# Patient Record
Sex: FEMALE | NOT HISPANIC OR LATINO | URBAN - METROPOLITAN AREA
[De-identification: names, ages, dates, MRNs, and addresses within clinical notes are randomized per-mention and may not be internally consistent; named-entity substitution may affect disease eponyms.]

---

## 2023-01-22 ENCOUNTER — ANESTHESIA EVENT (OUTPATIENT)
Dept: SURGERY | Facility: HOSPITAL | Age: 43
End: 2023-01-22
Payer: MEDICAID

## 2023-01-22 ENCOUNTER — HOSPITAL ENCOUNTER (OUTPATIENT)
Facility: HOSPITAL | Age: 43
Discharge: HOME | End: 2023-01-22
Attending: EMERGENCY MEDICINE | Admitting: EMERGENCY MEDICINE
Payer: MEDICAID

## 2023-01-22 ENCOUNTER — ANESTHESIA (OUTPATIENT)
Dept: SURGERY | Facility: HOSPITAL | Age: 43
End: 2023-01-22
Payer: MEDICAID

## 2023-01-22 VITALS
DIASTOLIC BLOOD PRESSURE: 62 MMHG | SYSTOLIC BLOOD PRESSURE: 122 MMHG | HEART RATE: 76 BPM | WEIGHT: 168 LBS | TEMPERATURE: 98 F | BODY MASS INDEX: 30.91 KG/M2 | HEIGHT: 62 IN | OXYGEN SATURATION: 94 % | RESPIRATION RATE: 19 BRPM

## 2023-01-22 DIAGNOSIS — D72.829 LEUKOCYTOSIS, UNSPECIFIED TYPE: ICD-10-CM

## 2023-01-22 DIAGNOSIS — N83.512 TORSION OF LEFT OVARY: ICD-10-CM

## 2023-01-22 DIAGNOSIS — Z98.890 STATUS POST LAPAROSCOPY: Primary | ICD-10-CM

## 2023-01-22 DIAGNOSIS — R10.32 LLQ ABDOMINAL PAIN: ICD-10-CM

## 2023-01-22 PROBLEM — N83.519 OVARIAN TORSION: Status: ACTIVE | Noted: 2023-01-22

## 2023-01-22 LAB
ABO + RH BLD: NORMAL
ALBUMIN SERPL BCP-MCNC: 4.2 G/DL (ref 3.5–5.2)
ALP SERPL-CCNC: 81 U/L (ref 55–135)
ALT SERPL W/O P-5'-P-CCNC: 7 U/L (ref 10–44)
ANION GAP SERPL CALC-SCNC: 9 MMOL/L (ref 8–16)
AST SERPL-CCNC: 14 U/L (ref 10–40)
B-HCG UR QL: NEGATIVE
BASOPHILS # BLD AUTO: 0.05 K/UL (ref 0–0.2)
BASOPHILS NFR BLD: 0.3 % (ref 0–1.9)
BILIRUB SERPL-MCNC: 0.2 MG/DL (ref 0.1–1)
BILIRUB UR QL STRIP: NEGATIVE
BLD GP AB SCN CELLS X3 SERPL QL: NORMAL
BUN SERPL-MCNC: 10 MG/DL (ref 6–20)
CALCIUM SERPL-MCNC: 10.3 MG/DL (ref 8.7–10.5)
CHLORIDE SERPL-SCNC: 105 MMOL/L (ref 95–110)
CLARITY UR: CLEAR
CO2 SERPL-SCNC: 23 MMOL/L (ref 23–29)
COLOR UR: YELLOW
CREAT SERPL-MCNC: 0.7 MG/DL (ref 0.5–1.4)
CTP QC/QA: YES
DIFFERENTIAL METHOD: ABNORMAL
EOSINOPHIL # BLD AUTO: 0 K/UL (ref 0–0.5)
EOSINOPHIL NFR BLD: 0.1 % (ref 0–8)
ERYTHROCYTE [DISTWIDTH] IN BLOOD BY AUTOMATED COUNT: 13.4 % (ref 11.5–14.5)
EST. GFR  (NO RACE VARIABLE): >60 ML/MIN/1.73 M^2
GLUCOSE SERPL-MCNC: 105 MG/DL (ref 70–110)
GLUCOSE UR QL STRIP: NEGATIVE
HCT VFR BLD AUTO: 44 % (ref 37–48.5)
HGB BLD-MCNC: 14.8 G/DL (ref 12–16)
HGB UR QL STRIP: NEGATIVE
IMM GRANULOCYTES # BLD AUTO: 0.07 K/UL (ref 0–0.04)
IMM GRANULOCYTES NFR BLD AUTO: 0.4 % (ref 0–0.5)
KETONES UR QL STRIP: ABNORMAL
LACTATE SERPL-SCNC: 1.4 MMOL/L (ref 0.5–2.2)
LEUKOCYTE ESTERASE UR QL STRIP: NEGATIVE
LIPASE SERPL-CCNC: 15 U/L (ref 4–60)
LYMPHOCYTES # BLD AUTO: 1.6 K/UL (ref 1–4.8)
LYMPHOCYTES NFR BLD: 8.8 % (ref 18–48)
MCH RBC QN AUTO: 30.3 PG (ref 27–31)
MCHC RBC AUTO-ENTMCNC: 33.6 G/DL (ref 32–36)
MCV RBC AUTO: 90 FL (ref 82–98)
MONOCYTES # BLD AUTO: 0.6 K/UL (ref 0.3–1)
MONOCYTES NFR BLD: 3.3 % (ref 4–15)
NEUTROPHILS # BLD AUTO: 15.9 K/UL (ref 1.8–7.7)
NEUTROPHILS NFR BLD: 87.1 % (ref 38–73)
NITRITE UR QL STRIP: NEGATIVE
NRBC BLD-RTO: 0 /100 WBC
PH UR STRIP: 8 [PH] (ref 5–8)
PLATELET # BLD AUTO: 433 K/UL (ref 150–450)
PMV BLD AUTO: 9.8 FL (ref 9.2–12.9)
POTASSIUM SERPL-SCNC: 4 MMOL/L (ref 3.5–5.1)
PROT SERPL-MCNC: 7.4 G/DL (ref 6–8.4)
PROT UR QL STRIP: ABNORMAL
RBC # BLD AUTO: 4.88 M/UL (ref 4–5.4)
SODIUM SERPL-SCNC: 137 MMOL/L (ref 136–145)
SP GR UR STRIP: 1.03 (ref 1–1.03)
URN SPEC COLLECT METH UR: ABNORMAL
UROBILINOGEN UR STRIP-ACNC: NEGATIVE EU/DL
WBC # BLD AUTO: 18.27 K/UL (ref 3.9–12.7)

## 2023-01-22 PROCEDURE — 83690 ASSAY OF LIPASE: CPT | Performed by: PHYSICIAN ASSISTANT

## 2023-01-22 PROCEDURE — G0378 HOSPITAL OBSERVATION PER HR: HCPCS

## 2023-01-22 PROCEDURE — 63600175 PHARM REV CODE 636 W HCPCS: Performed by: NURSE ANESTHETIST, CERTIFIED REGISTERED

## 2023-01-22 PROCEDURE — 36000709 HC OR TIME LEV III EA ADD 15 MIN: Performed by: OBSTETRICS & GYNECOLOGY

## 2023-01-22 PROCEDURE — 58661 LAPAROSCOPY REMOVE ADNEXA: CPT | Mod: LT,,, | Performed by: OBSTETRICS & GYNECOLOGY

## 2023-01-22 PROCEDURE — 88305 TISSUE EXAM BY PATHOLOGIST: CPT | Mod: 26,,, | Performed by: PATHOLOGY

## 2023-01-22 PROCEDURE — 58661 PR LAP,RMV  ADNEXAL STRUCTURE: ICD-10-PCS | Mod: 80,LT,, | Performed by: OBSTETRICS & GYNECOLOGY

## 2023-01-22 PROCEDURE — 81025 URINE PREGNANCY TEST: CPT | Performed by: PHYSICIAN ASSISTANT

## 2023-01-22 PROCEDURE — 71000033 HC RECOVERY, INTIAL HOUR: Performed by: OBSTETRICS & GYNECOLOGY

## 2023-01-22 PROCEDURE — 86900 BLOOD TYPING SEROLOGIC ABO: CPT | Performed by: PHYSICIAN ASSISTANT

## 2023-01-22 PROCEDURE — 96374 THER/PROPH/DIAG INJ IV PUSH: CPT

## 2023-01-22 PROCEDURE — 25000003 PHARM REV CODE 250: Performed by: PHYSICIAN ASSISTANT

## 2023-01-22 PROCEDURE — 25000003 PHARM REV CODE 250: Performed by: NURSE ANESTHETIST, CERTIFIED REGISTERED

## 2023-01-22 PROCEDURE — 27201423 OPTIME MED/SURG SUP & DEVICES STERILE SUPPLY: Performed by: OBSTETRICS & GYNECOLOGY

## 2023-01-22 PROCEDURE — 25500020 PHARM REV CODE 255: Performed by: EMERGENCY MEDICINE

## 2023-01-22 PROCEDURE — 37000009 HC ANESTHESIA EA ADD 15 MINS: Performed by: OBSTETRICS & GYNECOLOGY

## 2023-01-22 PROCEDURE — 83605 ASSAY OF LACTIC ACID: CPT | Performed by: PHYSICIAN ASSISTANT

## 2023-01-22 PROCEDURE — D9220A PRA ANESTHESIA: ICD-10-PCS | Mod: ANES,,, | Performed by: ANESTHESIOLOGY

## 2023-01-22 PROCEDURE — 58661 LAPAROSCOPY REMOVE ADNEXA: CPT | Mod: 80,LT,, | Performed by: OBSTETRICS & GYNECOLOGY

## 2023-01-22 PROCEDURE — 85025 COMPLETE CBC W/AUTO DIFF WBC: CPT | Performed by: PHYSICIAN ASSISTANT

## 2023-01-22 PROCEDURE — 37000008 HC ANESTHESIA 1ST 15 MINUTES: Performed by: OBSTETRICS & GYNECOLOGY

## 2023-01-22 PROCEDURE — 81003 URINALYSIS AUTO W/O SCOPE: CPT | Performed by: PHYSICIAN ASSISTANT

## 2023-01-22 PROCEDURE — 63600175 PHARM REV CODE 636 W HCPCS: Mod: TB,JG | Performed by: ANESTHESIOLOGY

## 2023-01-22 PROCEDURE — 96375 TX/PRO/DX INJ NEW DRUG ADDON: CPT | Mod: 59

## 2023-01-22 PROCEDURE — 00840 ANES IPER PX LOWER ABD NOS: CPT | Performed by: OBSTETRICS & GYNECOLOGY

## 2023-01-22 PROCEDURE — 25000003 PHARM REV CODE 250: Performed by: OBSTETRICS & GYNECOLOGY

## 2023-01-22 PROCEDURE — D9220A PRA ANESTHESIA: Mod: CRNA,,, | Performed by: NURSE ANESTHETIST, CERTIFIED REGISTERED

## 2023-01-22 PROCEDURE — D9220A PRA ANESTHESIA: Mod: ANES,,, | Performed by: ANESTHESIOLOGY

## 2023-01-22 PROCEDURE — 25000242 PHARM REV CODE 250 ALT 637 W/ HCPCS: Performed by: NURSE ANESTHETIST, CERTIFIED REGISTERED

## 2023-01-22 PROCEDURE — 63600175 PHARM REV CODE 636 W HCPCS: Performed by: PHYSICIAN ASSISTANT

## 2023-01-22 PROCEDURE — 63600175 PHARM REV CODE 636 W HCPCS: Performed by: OBSTETRICS & GYNECOLOGY

## 2023-01-22 PROCEDURE — D9220A PRA ANESTHESIA: ICD-10-PCS | Mod: CRNA,,, | Performed by: NURSE ANESTHETIST, CERTIFIED REGISTERED

## 2023-01-22 PROCEDURE — 36000708 HC OR TIME LEV III 1ST 15 MIN: Performed by: OBSTETRICS & GYNECOLOGY

## 2023-01-22 PROCEDURE — 96361 HYDRATE IV INFUSION ADD-ON: CPT | Mod: 59

## 2023-01-22 PROCEDURE — 99285 EMERGENCY DEPT VISIT HI MDM: CPT | Mod: 25

## 2023-01-22 PROCEDURE — 58661 PR LAP,RMV  ADNEXAL STRUCTURE: ICD-10-PCS | Mod: LT,,, | Performed by: OBSTETRICS & GYNECOLOGY

## 2023-01-22 PROCEDURE — 88305 TISSUE EXAM BY PATHOLOGIST: ICD-10-PCS | Mod: 26,,, | Performed by: PATHOLOGY

## 2023-01-22 PROCEDURE — 88305 TISSUE EXAM BY PATHOLOGIST: CPT | Performed by: PATHOLOGY

## 2023-01-22 PROCEDURE — 80053 COMPREHEN METABOLIC PANEL: CPT | Performed by: PHYSICIAN ASSISTANT

## 2023-01-22 RX ORDER — DEXAMETHASONE SODIUM PHOSPHATE 4 MG/ML
INJECTION, SOLUTION INTRA-ARTICULAR; INTRALESIONAL; INTRAMUSCULAR; INTRAVENOUS; SOFT TISSUE
Status: DISCONTINUED | OUTPATIENT
Start: 2023-01-22 | End: 2023-01-22

## 2023-01-22 RX ORDER — PHENYLEPHRINE HYDROCHLORIDE 10 MG/ML
INJECTION INTRAVENOUS
Status: DISCONTINUED | OUTPATIENT
Start: 2023-01-22 | End: 2023-01-22

## 2023-01-22 RX ORDER — IBUPROFEN 200 MG
400 TABLET ORAL EVERY 6 HOURS PRN
Status: ON HOLD | COMMUNITY
End: 2023-01-22 | Stop reason: HOSPADM

## 2023-01-22 RX ORDER — ALBUTEROL SULFATE 90 UG/1
AEROSOL, METERED RESPIRATORY (INHALATION)
Status: DISCONTINUED | OUTPATIENT
Start: 2023-01-22 | End: 2023-01-22

## 2023-01-22 RX ORDER — ASCORBIC ACID 500 MG
TABLET,CHEWABLE ORAL
COMMUNITY

## 2023-01-22 RX ORDER — CEFAZOLIN SODIUM 1 G/3ML
INJECTION, POWDER, FOR SOLUTION INTRAMUSCULAR; INTRAVENOUS
Status: DISCONTINUED | OUTPATIENT
Start: 2023-01-22 | End: 2023-01-22

## 2023-01-22 RX ORDER — HYDROCODONE BITARTRATE AND ACETAMINOPHEN 5; 325 MG/1; MG/1
1 TABLET ORAL EVERY 4 HOURS PRN
Status: DISCONTINUED | OUTPATIENT
Start: 2023-01-22 | End: 2023-01-23 | Stop reason: HOSPADM

## 2023-01-22 RX ORDER — DIPHENHYDRAMINE HYDROCHLORIDE 50 MG/ML
25 INJECTION INTRAMUSCULAR; INTRAVENOUS EVERY 4 HOURS PRN
Status: DISCONTINUED | OUTPATIENT
Start: 2023-01-22 | End: 2023-01-23 | Stop reason: HOSPADM

## 2023-01-22 RX ORDER — OXYCODONE AND ACETAMINOPHEN 5; 325 MG/1; MG/1
1 TABLET ORAL EVERY 4 HOURS PRN
Qty: 30 TABLET | Refills: 0 | Status: SHIPPED | OUTPATIENT
Start: 2023-01-22

## 2023-01-22 RX ORDER — MORPHINE SULFATE 4 MG/ML
3 INJECTION, SOLUTION INTRAMUSCULAR; INTRAVENOUS
Status: COMPLETED | OUTPATIENT
Start: 2023-01-22 | End: 2023-01-22

## 2023-01-22 RX ORDER — PROCHLORPERAZINE EDISYLATE 5 MG/ML
5 INJECTION INTRAMUSCULAR; INTRAVENOUS EVERY 6 HOURS PRN
Status: DISCONTINUED | OUTPATIENT
Start: 2023-01-22 | End: 2023-01-23 | Stop reason: HOSPADM

## 2023-01-22 RX ORDER — HYDROMORPHONE HYDROCHLORIDE 1 MG/ML
1 INJECTION, SOLUTION INTRAMUSCULAR; INTRAVENOUS; SUBCUTANEOUS EVERY 6 HOURS PRN
Status: DISCONTINUED | OUTPATIENT
Start: 2023-01-22 | End: 2023-01-23 | Stop reason: HOSPADM

## 2023-01-22 RX ORDER — IBUPROFEN 800 MG/1
800 TABLET ORAL EVERY 8 HOURS PRN
Qty: 40 TABLET | Refills: 0 | Status: SHIPPED | OUTPATIENT
Start: 2023-01-22

## 2023-01-22 RX ORDER — ROCURONIUM BROMIDE 10 MG/ML
INJECTION, SOLUTION INTRAVENOUS
Status: DISCONTINUED | OUTPATIENT
Start: 2023-01-22 | End: 2023-01-22

## 2023-01-22 RX ORDER — ONDANSETRON 4 MG/1
4 TABLET, ORALLY DISINTEGRATING ORAL
Status: COMPLETED | OUTPATIENT
Start: 2023-01-22 | End: 2023-01-22

## 2023-01-22 RX ORDER — PROPOFOL 10 MG/ML
VIAL (ML) INTRAVENOUS
Status: DISCONTINUED | OUTPATIENT
Start: 2023-01-22 | End: 2023-01-22

## 2023-01-22 RX ORDER — ONDANSETRON 8 MG/1
8 TABLET, ORALLY DISINTEGRATING ORAL EVERY 8 HOURS PRN
Status: DISCONTINUED | OUTPATIENT
Start: 2023-01-22 | End: 2023-01-23 | Stop reason: HOSPADM

## 2023-01-22 RX ORDER — DIPHENHYDRAMINE HCL 25 MG
25 CAPSULE ORAL EVERY 4 HOURS PRN
Status: DISCONTINUED | OUTPATIENT
Start: 2023-01-22 | End: 2023-01-23 | Stop reason: HOSPADM

## 2023-01-22 RX ORDER — FENTANYL CITRATE 50 UG/ML
25 INJECTION, SOLUTION INTRAMUSCULAR; INTRAVENOUS EVERY 5 MIN PRN
Status: DISCONTINUED | OUTPATIENT
Start: 2023-01-22 | End: 2023-01-22 | Stop reason: HOSPADM

## 2023-01-22 RX ORDER — MIDAZOLAM HYDROCHLORIDE 1 MG/ML
INJECTION, SOLUTION INTRAMUSCULAR; INTRAVENOUS
Status: DISCONTINUED | OUTPATIENT
Start: 2023-01-22 | End: 2023-01-22

## 2023-01-22 RX ORDER — FENTANYL CITRATE 50 UG/ML
INJECTION, SOLUTION INTRAMUSCULAR; INTRAVENOUS
Status: DISCONTINUED | OUTPATIENT
Start: 2023-01-22 | End: 2023-01-22

## 2023-01-22 RX ORDER — HYDROCODONE BITARTRATE AND ACETAMINOPHEN 10; 325 MG/1; MG/1
1 TABLET ORAL EVERY 4 HOURS PRN
Status: DISCONTINUED | OUTPATIENT
Start: 2023-01-22 | End: 2023-01-23 | Stop reason: HOSPADM

## 2023-01-22 RX ORDER — PROCHLORPERAZINE EDISYLATE 5 MG/ML
5 INJECTION INTRAMUSCULAR; INTRAVENOUS EVERY 30 MIN PRN
Status: DISCONTINUED | OUTPATIENT
Start: 2023-01-22 | End: 2023-01-22 | Stop reason: HOSPADM

## 2023-01-22 RX ORDER — SODIUM CHLORIDE 0.9 % (FLUSH) 0.9 %
10 SYRINGE (ML) INJECTION
Status: DISCONTINUED | OUTPATIENT
Start: 2023-01-22 | End: 2023-01-22 | Stop reason: HOSPADM

## 2023-01-22 RX ORDER — HYDROMORPHONE HYDROCHLORIDE 1 MG/ML
1 INJECTION, SOLUTION INTRAMUSCULAR; INTRAVENOUS; SUBCUTANEOUS
Status: COMPLETED | OUTPATIENT
Start: 2023-01-22 | End: 2023-01-22

## 2023-01-22 RX ORDER — LIDOCAINE HYDROCHLORIDE 20 MG/ML
INJECTION INTRAVENOUS
Status: DISCONTINUED | OUTPATIENT
Start: 2023-01-22 | End: 2023-01-22

## 2023-01-22 RX ORDER — MUPIROCIN 20 MG/G
OINTMENT TOPICAL
Status: DISCONTINUED | OUTPATIENT
Start: 2023-01-22 | End: 2023-01-22 | Stop reason: HOSPADM

## 2023-01-22 RX ORDER — SODIUM CHLORIDE 9 MG/ML
INJECTION, SOLUTION INTRAVENOUS CONTINUOUS
Status: DISCONTINUED | OUTPATIENT
Start: 2023-01-22 | End: 2023-01-23 | Stop reason: HOSPADM

## 2023-01-22 RX ORDER — KETOROLAC TROMETHAMINE 30 MG/ML
15 INJECTION, SOLUTION INTRAMUSCULAR; INTRAVENOUS
Status: COMPLETED | OUTPATIENT
Start: 2023-01-22 | End: 2023-01-22

## 2023-01-22 RX ORDER — KETOROLAC TROMETHAMINE 30 MG/ML
INJECTION, SOLUTION INTRAMUSCULAR; INTRAVENOUS
Status: DISCONTINUED | OUTPATIENT
Start: 2023-01-22 | End: 2023-01-22

## 2023-01-22 RX ORDER — ONDANSETRON 2 MG/ML
4 INJECTION INTRAMUSCULAR; INTRAVENOUS
Status: COMPLETED | OUTPATIENT
Start: 2023-01-22 | End: 2023-01-22

## 2023-01-22 RX ORDER — ACETAMINOPHEN 10 MG/ML
1000 INJECTION, SOLUTION INTRAVENOUS ONCE
Status: COMPLETED | OUTPATIENT
Start: 2023-01-22 | End: 2023-01-22

## 2023-01-22 RX ORDER — IBUPROFEN 600 MG/1
600 TABLET ORAL EVERY 6 HOURS PRN
Status: DISCONTINUED | OUTPATIENT
Start: 2023-01-22 | End: 2023-01-23 | Stop reason: HOSPADM

## 2023-01-22 RX ADMIN — PHENYLEPHRINE HYDROCHLORIDE 100 MCG: 10 INJECTION INTRAVENOUS at 06:01

## 2023-01-22 RX ADMIN — IOHEXOL 75 ML: 350 INJECTION, SOLUTION INTRAVENOUS at 01:01

## 2023-01-22 RX ADMIN — FENTANYL CITRATE 50 MCG: 0.05 INJECTION, SOLUTION INTRAMUSCULAR; INTRAVENOUS at 07:01

## 2023-01-22 RX ADMIN — DEXAMETHASONE SODIUM PHOSPHATE 4 MG: 4 INJECTION, SOLUTION INTRAMUSCULAR; INTRAVENOUS at 06:01

## 2023-01-22 RX ADMIN — MIDAZOLAM HYDROCHLORIDE 2 MG: 1 INJECTION, SOLUTION INTRAMUSCULAR; INTRAVENOUS at 05:01

## 2023-01-22 RX ADMIN — HYDROMORPHONE HYDROCHLORIDE 1 MG: 1 INJECTION, SOLUTION INTRAMUSCULAR; INTRAVENOUS; SUBCUTANEOUS at 04:01

## 2023-01-22 RX ADMIN — FENTANYL CITRATE 25 MCG: 50 INJECTION, SOLUTION INTRAMUSCULAR; INTRAVENOUS at 07:01

## 2023-01-22 RX ADMIN — CEFAZOLIN SODIUM 2 G: 1 POWDER, FOR SOLUTION INTRAMUSCULAR; INTRAVENOUS at 06:01

## 2023-01-22 RX ADMIN — KETOROLAC TROMETHAMINE 15 MG: 30 INJECTION, SOLUTION INTRAMUSCULAR; INTRAVENOUS at 06:01

## 2023-01-22 RX ADMIN — PROCHLORPERAZINE EDISYLATE 5 MG: 5 INJECTION INTRAMUSCULAR; INTRAVENOUS at 06:01

## 2023-01-22 RX ADMIN — ONDANSETRON 4 MG: 4 TABLET, ORALLY DISINTEGRATING ORAL at 04:01

## 2023-01-22 RX ADMIN — PROPOFOL 160 MG: 10 INJECTION, EMULSION INTRAVENOUS at 06:01

## 2023-01-22 RX ADMIN — SODIUM CHLORIDE 1000 ML: 9 INJECTION, SOLUTION INTRAVENOUS at 12:01

## 2023-01-22 RX ADMIN — SODIUM CHLORIDE, SODIUM LACTATE, POTASSIUM CHLORIDE, AND CALCIUM CHLORIDE: .6; .31; .03; .02 INJECTION, SOLUTION INTRAVENOUS at 07:01

## 2023-01-22 RX ADMIN — MORPHINE SULFATE 3 MG: 4 INJECTION INTRAVENOUS at 12:01

## 2023-01-22 RX ADMIN — ACETAMINOPHEN 1000 MG: 10 INJECTION INTRAVENOUS at 07:01

## 2023-01-22 RX ADMIN — IBUPROFEN 600 MG: 600 TABLET ORAL at 10:01

## 2023-01-22 RX ADMIN — ROCURONIUM BROMIDE 50 MG: 10 INJECTION, SOLUTION INTRAVENOUS at 06:01

## 2023-01-22 RX ADMIN — ALBUTEROL SULFATE 2 PUFF: 90 AEROSOL, METERED RESPIRATORY (INHALATION) at 06:01

## 2023-01-22 RX ADMIN — KETOROLAC TROMETHAMINE 15 MG: 30 INJECTION, SOLUTION INTRAMUSCULAR; INTRAVENOUS at 12:01

## 2023-01-22 RX ADMIN — ONDANSETRON 4 MG: 2 INJECTION INTRAMUSCULAR; INTRAVENOUS at 12:01

## 2023-01-22 RX ADMIN — SUGAMMADEX 300 MG: 100 INJECTION, SOLUTION INTRAVENOUS at 06:01

## 2023-01-22 RX ADMIN — SODIUM CHLORIDE, SODIUM LACTATE, POTASSIUM CHLORIDE, AND CALCIUM CHLORIDE: .6; .31; .03; .02 INJECTION, SOLUTION INTRAVENOUS at 05:01

## 2023-01-22 RX ADMIN — FENTANYL CITRATE 100 MCG: 0.05 INJECTION, SOLUTION INTRAMUSCULAR; INTRAVENOUS at 06:01

## 2023-01-22 RX ADMIN — LIDOCAINE HYDROCHLORIDE 100 MG: 20 INJECTION, SOLUTION INTRAVENOUS at 06:01

## 2023-01-22 RX ADMIN — PHENYLEPHRINE HYDROCHLORIDE 200 MCG: 10 INJECTION INTRAVENOUS at 06:01

## 2023-01-22 NOTE — ED NOTES
Explained to pt that she will be going to PACU. Pt. States she does not know what is going to be done. OB MD has not spoke to pt. Pt. Made aware that she needs to remove all of her clothes and jewelry.  at bedside and will be taking pt jewelry. Pt noted t be nervous and tearful.

## 2023-01-22 NOTE — PHARMACY MED REC
"Admission Medication History     The home medication history was taken by Nani Murphy.    You may go to "Admission" then "Reconcile Home Medications" tabs to review and/or act upon these items.     The home medication list has been updated by the Pharmacy department.   Please read ALL comments highlighted in yellow.   Please address this information as you see fit.    Feel free to contact us if you have any questions or require assistance.    Medications listed below were obtained from: Patient/family and Analytic software- FP Complete and added to home medication:   Women's Multivitamins   Vitamin C chewable   Ibuprofen 200 mg   Gas-X   Tums   Eye Drop Allergy Relief   Zyrtec    The medication reconciliation was completed by the patient's bedside.      Nani Murphy  [phone]                  .        "

## 2023-01-22 NOTE — ANESTHESIA PREPROCEDURE EVALUATION
2023  Francesca Durán is a 42 y.o., female.    Pre-operative evaluation for Procedure(s) (LRB):  LAPAROSCOPY, DIAGNOSTIC (N/A)    NPO >8  METS >4    Vitals:    23 1225 23 1343 23 1515 23 1609   BP:  120/69 (!) 141/71    BP Location:  Left arm Left arm    Patient Position:  Sitting Sitting    Pulse:  96 105    Resp: 18 18 18 18   Temp:  36.9 °C (98.4 °F)     TempSrc:  Oral     SpO2:  97% 96%    Weight:       Height:           There is no problem list on file for this patient.      Review of patient's allergies indicates:  No Known Allergies    No current facility-administered medications on file prior to encounter.     Current Outpatient Medications on File Prior to Encounter   Medication Sig Dispense Refill    norethindrone-e.estradiol-iron (LO LOESTRIN FE) 1 mg-10 mcg(24) /10 mcg (2) Tab Take 1 tablet by mouth once daily. 28 tablet 11       History reviewed. No pertinent surgical history.    Social History     Socioeconomic History    Marital status:    Tobacco Use    Smoking status: Every Day   Substance and Sexual Activity    Alcohol use: Not Currently    Drug use: Never     UPT negative    CBC:   Recent Labs     23  1218   WBC 18.27*   RBC 4.88   HGB 14.8   HCT 44.0      MCV 90   MCH 30.3   MCHC 33.6       CMP:   Recent Labs     23  1218      K 4.0      CO2 23   BUN 10   CREATININE 0.7      CALCIUM 10.3   ALBUMIN 4.2   PROT 7.4   ALKPHOS 81   ALT 7*   AST 14   BILITOT 0.2       INR  No results for input(s): PT, INR, PROTIME, APTT in the last 72 hours.        Diagnostic Studies:      EKD Echo:  No results found for this or any previous visit.    Pre-op Assessment    I have reviewed the Patient Summary Reports.    I have reviewed the NPO Status.   I have reviewed the Medications.     Review of Systems  Anesthesia  Hx:  No problems with previous Anesthesia  History of prior surgery of interest to airway management or planning: Denies Family Hx of Anesthesia complications.   Denies Personal Hx of Anesthesia complications.   Social:  Smoker THC use   Hematology/Oncology:  Hematology Normal   Oncology Normal     EENT/Dental:EENT/Dental Normal   Cardiovascular:  Cardiovascular Normal Exercise tolerance: good     Pulmonary:  Pulmonary Normal    Hepatic/GI:  Hepatic/GI Normal    Neurological:  Neurology Normal    Endocrine:  Obesity / BMI > 30      Physical Exam  General: Cooperative, Alert and Oriented    Airway:  Mallampati: III   Mouth Opening: Normal  TM Distance: Normal  Tongue: Normal  Neck ROM: Normal ROM    Dental:  Intact, Periodontal disease  Multiple chipped, missing, carious teeth; none loose      Anesthesia Plan  Type of Anesthesia, risks & benefits discussed:    Anesthesia Type: Gen ETT  Intra-op Monitoring Plan: Standard ASA Monitors  Post Op Pain Control Plan: multimodal analgesia  Induction:  rapid sequence and IV  Airway Plan: Video, Post-Induction  Informed Consent: Informed consent signed with the Patient and all parties understand the risks and agree with anesthesia plan.  All questions answered. Patient consented to blood products? Yes  ASA Score: 2 Emergent    Ready For Surgery From Anesthesia Perspective.     .

## 2023-01-22 NOTE — ED PROVIDER NOTES
Encounter Date: 1/22/2023    SCRIBE #1 NOTE: Deborah GALVEZ am scribing for, and in the presence of,  Ayush Ladd PA-C. I have scribed the following portions of the note - Other sections scribed: HPI, ROS.     History     Chief Complaint   Patient presents with    Abdominal Pain          Flank Pain     Pt to ED with complaints of LLQ pain that radiates to L flank. States pain began around 0200 this AM. States no medications are relieving the pain. Denies having these symptoms before.     Francesca Durán is a 42 y.o. female, with a past medical history of HTN, who presents to the ED with LLQ abdominal pain that began at 2 am today. Patient has associated symptoms of intermittent left flank pain, frequency, nausea, emesis, and diarrhea. Patient endorsed Ibuprofen this am with no relief. No other exacerbating or alleviating factors. Patient denies dysuria, constipation, or other associated symptoms. Patient denies history of kidney stones. No known allergies.       The history is provided by the patient. No  was used.   Review of patient's allergies indicates:  No Known Allergies  History reviewed. No pertinent past medical history.  History reviewed. No pertinent surgical history.  Family History   Problem Relation Age of Onset    Hypertension Father     Breast cancer Other      Social History     Tobacco Use    Smoking status: Every Day   Substance Use Topics    Alcohol use: Not Currently    Drug use: Never     Review of Systems   Constitutional:  Negative for fever.   HENT:  Negative for congestion, sore throat and trouble swallowing.    Eyes:  Negative for visual disturbance.   Respiratory:  Negative for cough and shortness of breath.    Cardiovascular:  Negative for chest pain.   Gastrointestinal:  Positive for abdominal pain (LLQ), diarrhea, nausea and vomiting. Negative for constipation.   Genitourinary:  Positive for frequency. Negative for dysuria, flank pain and urgency.    Musculoskeletal:  Negative for back pain.        (+) Flank pain    Skin:  Negative for rash.   Neurological:  Negative for headaches.   All other systems reviewed and are negative.    Physical Exam     Initial Vitals [01/22/23 1107]   BP Pulse Resp Temp SpO2   (!) 152/76 (!) 115 16 97.7 °F (36.5 °C) 98 %      MAP       --         Physical Exam    Nursing note and vitals reviewed.  Constitutional: She appears well-developed and well-nourished. She is not diaphoretic.   Appears uncomfortable   HENT:   Head: Atraumatic.   Right Ear: External ear normal.   Left Ear: External ear normal.   Eyes: Conjunctivae and EOM are normal.   Neck: No tracheal deviation present.   Normal range of motion.  Cardiovascular:  Normal rate and regular rhythm.           Pulmonary/Chest: No accessory muscle usage or stridor. No tachypnea. No respiratory distress.   Abdominal: Abdomen is soft. She exhibits no distension. There is abdominal tenderness in the left lower quadrant.   No right CVA tenderness.  No left CVA tenderness. There is no rebound, no guarding, no tenderness at McBurney's point and negative Dia's sign. negative Rovsing's sign  Genitourinary:    Genitourinary Comments: Chaperoned by Abby AUGUSTE.     Cervical closed.  No friability, discharge, or bleeding.  Very mild tenderness without obvious mass to the left adnexa.     Musculoskeletal:         General: No edema. Normal range of motion.      Cervical back: Normal range of motion.     Neurological: She is alert and oriented to person, place, and time. She displays no tremor. She displays no seizure activity. Coordination and gait normal.   Skin: Skin is intact. Capillary refill takes less than 2 seconds. No rash noted. No erythema.       ED Course   Procedures  Labs Reviewed   URINALYSIS, REFLEX TO URINE CULTURE - Abnormal; Notable for the following components:       Result Value    Protein, UA Trace (*)     Ketones, UA Trace (*)     All other components within normal  limits    Narrative:     Specimen Source->Urine   CBC W/ AUTO DIFFERENTIAL - Abnormal; Notable for the following components:    WBC 18.27 (*)     Gran # (ANC) 15.9 (*)     Immature Grans (Abs) 0.07 (*)     Gran % 87.1 (*)     Lymph % 8.8 (*)     Mono % 3.3 (*)     All other components within normal limits   COMPREHENSIVE METABOLIC PANEL - Abnormal; Notable for the following components:    ALT 7 (*)     All other components within normal limits   LIPASE   LACTIC ACID, PLASMA   POCT URINE PREGNANCY   TYPE & SCREEN          Imaging Results              US Transvaginal Non OB (Final result)  Result time 01/22/23 15:57:03      Final result by Stephenie Pickens MD (01/22/23 15:57:03)                   Impression:      The left ovary appears enlarged and heterogeneous, and normal ovarian tissue is likely largely replaced by the ovarian mass seen on earlier CT.  No arterial flow is detectable on color Doppler imaging.  This could be due to solid ovarian mass with relatively minimal perfusion to tumor components.  Alternatively, these findings could reflect ovarian torsion.  Clinical correlation is recommended.    Findings were discussed with BATSHEVA Ladd at 15:55      Electronically signed by: Stephenie Pickens MD  Date:    01/22/2023  Time:    15:57               Narrative:    EXAMINATION:  US TRANSVAGINAL NON OB    CLINICAL HISTORY:  Left lower quadrant pain    TECHNIQUE:  Transabdominal and transvaginal images of the pelvis were obtained    COMPARISON:  CT performed same day at 13:12    FINDINGS:  The uterus is not enlarged, measuring 8.2 x 3.8 x 3.2 cm in size.  No uterine masses are seen.  The endometrial stripe is not thickened, measuring approximately 11 mm.  Nabothian cysts are present in the cervix.    The right ovary measures 2.8 x 2.5 x 2.4 cm and has normal arterial and venous flow.  The left ovary appears  enlarged heterogeneous, measuring 4.8 x 4.1 x 3.9 cm.  No discrete mass is seen but the normal ovarian  tissue may be completely replaced by mass, noting cystic and solid lesion was evident on earlier CT.  The echogenic and shadowing appearance on today's ultrasound can be seen with ovarian dermoid, although the appearance is not definitively diagnostic.  No arterial flow is detected within this tissue.    There is trace free fluid in the pelvis.                                        CT Abdomen Pelvis With Contrast (Final result)  Result time 01/22/23 13:25:58      Final result by Too Flynn MD (01/22/23 13:25:58)                   Impression:      This report was flagged in Epic as abnormal.    1. 3 mm pulmonary nodule within the right lower lobe.  For a solid nodule <6 mm, Fleischner Society 2017 guidelines recommend no routine follow up for a low risk patient, or follow-up with non-contrast chest CT at 12 months in a high risk patient.  2. Findings suggesting dermoid cyst within the left ovary, if there is pain in the region, ultrasound could be performed as warranted.  3. Right nonobstructive nephrolithiasis, no findings to suggest obstructive uropathy.  4. Possible hepatic steatosis, correlation with LFTs recommended.  5. Please see above for several additional findings.      Electronically signed by: Too Flynn MD  Date:    01/22/2023  Time:    13:25               Narrative:    EXAMINATION:  CT ABDOMEN PELVIS WITH CONTRAST    CLINICAL HISTORY:  LLQ abdominal pain;    TECHNIQUE:  Low dose axial images, sagittal and coronal reformations were obtained from the lung bases to the pubic symphysis following the IV administration of 75 mL of Omnipaque 350 no or    COMPARISON:  None.    FINDINGS:  Images of the lower thorax are remarkable for bilateral dependent atelectasis.  There is a 3 mm pulmonary nodule adjacent to the fissure within the right lower lobe.    The liver is mildly hypoattenuating, possibly reflecting steatosis.  The spleen, pancreas, gallbladder and adrenal glands grossly unremarkable.   There is no biliary dilation or ascites.  The stomach is decompressed without significant wall thickening.  The portal vein, splenic vein, SMV, celiac axis and SMA all are patent.  No significant abdominal lymphadenopathy.    The kidneys enhance symmetrically without hydronephrosis.  There is right nonobstructive nephrolithiasis.  The bilateral ureters are unable to be followed in their entirety the urinary bladder, no definite calculi seen or secondary findings to suggest obstructive uropathy.  The urinary bladder is nondistended.  The uterus is unremarkable.  There is a dominant follicle within the right ovary.  There is a cystic and solid appearing focus arising from the left ovary measuring 4.4 cm, internal fat content suggests dermoid however further evaluation could be performed with ultrasound as warranted.  There is a small amount of fluid in the pelvis.    There are a few scattered colonic diverticula without inflammation.  The terminal ileum and appendix are unremarkable.  The small bowel is grossly unremarkable.  There are a few scattered shotty periaortic and paracaval lymph nodes.  There is atherosclerotic calcification of the aorta and its branches.    There are degenerative changes of the pubic symphysis and spine.  No significant inguinal lymphadenopathy.                                       Medications   sodium chloride 0.9% bolus 1,000 mL 1,000 mL (0 mLs Intravenous Stopped 1/22/23 1339)   ketorolac injection 15 mg (15 mg Intravenous Given 1/22/23 1219)   morphine injection 3 mg (3 mg Intravenous Given 1/22/23 1225)   ondansetron injection 4 mg (4 mg Intravenous Given 1/22/23 1218)   iohexoL (OMNIPAQUE 350) injection 75 mL (75 mLs Intravenous Given 1/22/23 1311)   HYDROmorphone injection 1 mg (1 mg Intravenous Given 1/22/23 1609)   ondansetron disintegrating tablet 4 mg (4 mg Oral Given 1/22/23 1608)     Medical Decision Making:   History:   Old Medical Records: I decided to obtain old medical  records.  ED Management:  Ovarian torsion vs. dermoid tumor that has replaced normal ovarian tissue and is obscuring blood flow to left ovary.  Given persistent pain, OBGYN is consulted; will bring to OR.  Can not exclude neoplastic process in the ED.  Leukocytosis noted without fever.  Lactic normal. Workup otherwise reassuring.  No diverticulitis or ureteral stone. Pt agreeable to plan.         Scribe Attestation:   Scribe #1: I performed the above scribed service and the documentation accurately describes the services I performed. I attest to the accuracy of the note.    Attending Attestation:         Attending Critical Care:   Critical Care Times:   Direct Patient Care (initial evaluation, reassessments, and time considering the case)................................................................10 minutes.   Additional History from reviewing old medical records or taking additional history from the family, EMS, PCP, etc.......................6 minutes.   Ordering, Reviewing, and Interpreting Diagnostic Studies...............................................................................................................6 minutes.   Documentation..................................................................................................................................................................................5 minutes.   Consultation with other Physicians. .................................................................................................................................................5 minutes.   Consultation with the patient's family directly relating to the patient's condition, care, and DNR status (when patient unable)......0 minutes.   Other..................................................................................................................................................................................................0 minutes.    ==============================================================  Total Critical Care Time - exclusive of procedural time: 32 minutes.  ==============================================================  Critical care reasons: ovarian torsion.   Critical care was time spent personally by me on the following activities: obtaining history from patient or relative, examination of patient, review of x-rays / CT sent with the patient, review of old charts, ordering lab, x-rays, and/or EKG, development of treatment plan with patient or relative, ordering and performing treatments and interventions, evaluation of patient's response to treatment, discussion with consultants and re-evaluation of patient's conition.   Critical Care Condition: potentially life-threatening                      Clinical Impression:   Final diagnoses:  [R10.32] LLQ abdominal pain  [N83.512] Torsion of left ovary (Primary)  [D72.829] Leukocytosis, unspecified type        ED Disposition Condition    Observation Stable           I, Ayush Ladd PA-C, personally performed the services described in this documentation. All medical record entries made by the scribe were at my direction and in my presence. I have reviewed the chart and agree that the record reflects my personal performance and is accurate and complete.      Ayush Ladd PA-C  01/22/23 0159

## 2023-01-22 NOTE — ED NOTES
"Charge nurse called and states, " pt can go up" did not provide specific on where the pt. Should go.   "

## 2023-01-23 NOTE — TRANSFER OF CARE
"Anesthesia Transfer of Care Note    Patient: Francesca Durán    Procedure(s) Performed: Procedure(s) (LRB):  LAPAROSCOPY, DIAGNOSTIC,LEFT SALPINGO-0OPHORECTOMY (N/A)    Patient location: PACU    Anesthesia Type: general    Transport from OR: Transported from OR on room air with adequate spontaneous ventilation    Post pain: adequate analgesia    Post assessment: no apparent anesthetic complications and tolerated procedure well    Post vital signs: stable    Level of consciousness: awake, alert and oriented    Nausea/Vomiting: no nausea/vomiting    Complications: none    Transfer of care protocol was followed      Last vitals:   Visit Vitals  BP (!) 110/58 (BP Location: Left arm)   Pulse 86   Temp 36.6 °C (97.9 °F) (Oral)   Resp 18   Ht 5' 2" (1.575 m)   Wt 76.2 kg (168 lb)   LMP 01/18/2023 (Exact Date)   SpO2 100%   Breastfeeding No   BMI 30.73 kg/m²     "

## 2023-01-23 NOTE — PLAN OF CARE
VSS. NAD. Ambulating and voiding. Denies Nausea. Discussed POC, follow-up and pain management. Pt verbalizes understanding.

## 2023-01-23 NOTE — SUBJECTIVE & OBJECTIVE
OB History    Para Term  AB Living   2 2 2 0 0 1   SAB IAB Ectopic Multiple Live Births   0 0 0 0 1      # Outcome Date GA Lbr Ayo/2nd Weight Sex Delivery Anes PTL Lv   2 Term     M Vag-Spont   BECKY   1 Term     F Vag-Spont   FD     History reviewed. No pertinent past medical history.  History reviewed. No pertinent surgical history.    PTA Medications   Medication Sig    ascorbic acid, vitamin C, (VITAMIN C) 500 mg Chew Take by mouth.    calcium carbonate (TUMS ORAL) Take by mouth.    ibuprofen (ADVIL,MOTRIN) 200 MG tablet Take 400 mg by mouth every 6 (six) hours as needed for Pain.    multivit with calcium,iron,min (WOMEN'S MULTIPLE VITAMINS ORAL) Take by mouth.    simethicone (GAS-X ORAL) Take by mouth.    norethindrone-e.estradiol-iron (LO LOESTRIN FE) 1 mg-10 mcg(24) /10 mcg (2) Tab Take 1 tablet by mouth once daily.    tetrahydrozoline HCl/zinc sulf (EYE DROPS ALLERGY RELIEF OPHT) Apply to eye.       Review of patient's allergies indicates:   Allergen Reactions    Codeine Nausea Only        Family History       Problem Relation (Age of Onset)    Breast cancer Other    Hypertension Father          Tobacco Use    Smoking status: Every Day    Smokeless tobacco: Not on file   Substance and Sexual Activity    Alcohol use: Not Currently    Drug use: Never    Sexual activity: Not on file     Review of Systems   Constitutional:  Negative for chills and fever.   Eyes:  Negative for visual disturbance.   Respiratory:  Negative for cough and wheezing.    Cardiovascular:  Negative for chest pain and palpitations.   Gastrointestinal:  Positive for abdominal pain, diarrhea, nausea and vomiting.   Genitourinary:  Positive for pelvic pain. Negative for dysuria, frequency, hematuria, vaginal bleeding, vaginal discharge and vaginal pain.   Neurological:  Negative for headaches.   Psychiatric/Behavioral:  Negative for depression.     Objective:     Vital Signs (Most Recent):  Temp: 98.4 °F (36.9 °C)  (01/22/23 1343)  Pulse: 105 (01/22/23 1515)  Resp: 18 (01/22/23 1609)  BP: (!) 141/71 (01/22/23 1515)  SpO2: 96 % (01/22/23 1515)   Vital Signs (24h Range):  Temp:  [97.7 °F (36.5 °C)-98.4 °F (36.9 °C)] 98.4 °F (36.9 °C)  Pulse:  [] 105  Resp:  [16-18] 18  SpO2:  [96 %-98 %] 96 %  BP: (120-152)/(69-76) 141/71     Weight: 76.2 kg (168 lb)  Body mass index is 30.73 kg/m².    Patient's last menstrual period was 01/18/2023 (exact date).    Physical Exam:   Constitutional: She is oriented to person, place, and time. She appears well-developed and well-nourished. No distress.       Cardiovascular:  Normal rate.             Pulmonary/Chest: Effort normal.        Abdominal: Soft. She exhibits no distension.                 Neurological: She is alert and oriented to person, place, and time.    Skin: Skin is warm and dry.    Psychiatric: She has a normal mood and affect.     Laboratory:  CBC:   Recent Labs   Lab 01/22/23  1218   WBC 18.27*   RBC 4.88   HGB 14.8   HCT 44.0      MCV 90   MCH 30.3   MCHC 33.6     CMP:   Recent Labs   Lab 01/22/23  1218      CALCIUM 10.3   ALBUMIN 4.2   PROT 7.4      K 4.0   CO2 23      BUN 10   CREATININE 0.7   ALKPHOS 81   ALT 7*   AST 14   BILITOT 0.2     Urine Pregnancy Test: No results for input(s): PREGTESTUR in the last 48 hours.    Diagnostic Results:  US: Reviewed 01/22/2023     FINDINGS:  The uterus is not enlarged, measuring 8.2 x 3.8 x 3.2 cm in size.  No uterine masses are seen.  The endometrial stripe is not thickened, measuring approximately 11 mm.  Nabothian cysts are present in the cervix.     The right ovary measures 2.8 x 2.5 x 2.4 cm and has normal arterial and venous flow.  The left ovary appears  enlarged heterogeneous, measuring 4.8 x 4.1 x 3.9 cm.  No discrete mass is seen but the normal ovarian tissue may be completely replaced by mass, noting cystic and solid lesion was evident on earlier CT.  The echogenic and shadowing appearance on  today's ultrasound can be seen with ovarian dermoid, although the appearance is not definitively diagnostic.  No arterial flow is detected within this tissue.     There is trace free fluid in the pelvis.     Impression:     The left ovary appears enlarged and heterogeneous, and normal ovarian tissue is likely largely replaced by the ovarian mass seen on earlier CT.  No arterial flow is detectable on color Doppler imaging.  This could be due to solid ovarian mass with relatively minimal perfusion to tumor components.  Alternatively, these findings could reflect ovarian torsion.  Clinical correlation is recommended.         CT: Reviewed  FINDINGS:  Images of the lower thorax are remarkable for bilateral dependent atelectasis.  There is a 3 mm pulmonary nodule adjacent to the fissure within the right lower lobe.     The liver is mildly hypoattenuating, possibly reflecting steatosis.  The spleen, pancreas, gallbladder and adrenal glands grossly unremarkable.  There is no biliary dilation or ascites.  The stomach is decompressed without significant wall thickening.  The portal vein, splenic vein, SMV, celiac axis and SMA all are patent.  No significant abdominal lymphadenopathy.     The kidneys enhance symmetrically without hydronephrosis.  There is right nonobstructive nephrolithiasis.  The bilateral ureters are unable to be followed in their entirety the urinary bladder, no definite calculi seen or secondary findings to suggest obstructive uropathy.  The urinary bladder is nondistended.  The uterus is unremarkable.  There is a dominant follicle within the right ovary.  There is a cystic and solid appearing focus arising from the left ovary measuring 4.4 cm, internal fat content suggests dermoid however further evaluation could be performed with ultrasound as warranted.  There is a small amount of fluid in the pelvis.     There are a few scattered colonic diverticula without inflammation.  The terminal ileum and  appendix are unremarkable.  The small bowel is grossly unremarkable.  There are a few scattered shotty periaortic and paracaval lymph nodes.  There is atherosclerotic calcification of the aorta and its branches.     There are degenerative changes of the pubic symphysis and spine.  No significant inguinal lymphadenopathy.     Impression:     This report was flagged in Epic as abnormal.     1. 3 mm pulmonary nodule within the right lower lobe.  For a solid nodule <6 mm, Fleischner Society 2017 guidelines recommend no routine follow up for a low risk patient, or follow-up with non-contrast chest CT at 12 months in a high risk patient.  2. Findings suggesting dermoid cyst within the left ovary, if there is pain in the region, ultrasound could be performed as warranted.  3. Right nonobstructive nephrolithiasis, no findings to suggest obstructive uropathy.  4. Possible hepatic steatosis, correlation with LFTs recommended.  5. Please see above for several additional findings

## 2023-01-23 NOTE — ASSESSMENT & PLAN NOTE
- Explained ovarian torsion in detail with patient and   - Recommend removal of the ovary as there is minimal to no ovarian tissue present per US/CT scan  - Consents signed and in chart  - To OR for dx laparoscopy   [facility] (formerly [facility]) EXAM NOTE      Chief Complaint   Patient presents with     pinched nerve       ASSESSMENT & PLAN    Marvin was seen today for pinched nerve.    Diagnoses and all orders for this visit:    Trapezius muscle spasm: Trapezius muscle does seem to be spasming or very tense.  Trigger point identified and injected as below.  In addition handout provided for exercises/stretches for thoracic outlet syndrome.  I think he has a couple of things going on he appears to have both a central and peripheral nerve impingement.  His shoulder joint seems fine without signs of impingement there.  Will trial the trigger point injection today, start Flexeril per below (discussed side effects of fatigue/sleepiness and encouraged to take the first pill at night or when he is not working to see how it affects him) can continue with Tylenol or ibuprofen, icing the shoulders and neck area and follow-up in 1 to 2 weeks via MyChart to let me know how he is doing.  -     lidocaine 10 mg/mL (1 %) injection 2.5 mL  -     cyclobenzaprine (FLEXERIL) 5 MG tablet; Take 1-2 tablets (5-10 mg total) by mouth 3 (three) times a day as needed for muscle spasms.    Trigger point of shoulder region, left: Trigger point injection per below left trap medial to scapula mid Thoracics    Cervical radiculopathy: He did have a positive Spurling's on the left and when I was trying to do some OMT on his neck using muscle energy it elicited the numbness and tingling down his left arm and so I stopped.  Apparently the chiropractor did get some x-rays and discussed he noted some subluxation at multiple levels.  Discussed MRI for definitive diagnosis as well as possible referral to the spine clinic for comprehensive approach to cervical radiculopathy and is likely peripheral nerve impingement as well.  He will think about it.  Wanted to start with above to see if there is any improvement.  He will follow up in 1-2 week via MyChart.    Numbness and tingling  in left arm: As above no signs of weakness and reflexes intact.  Able to elicit with movement of the neck.  Consider EMG in future.      HISTORY    Marvin Graves is a 32 y.o.  male who presents for the following issues:    Went to chiropractor a couple times  Went twice     Nothing changed.   Good friend  recommend going to see  a DO  2.5 months.   Neck was sore a couple months ago.   Bed Collapses in.   End up on the floor- sleeps well there.     Pinched nerve in shoulder  Get the tingling a little bit  Work putting my head up.   Doesn't boterh me sleeping.   Every once in awhile   2.5 months of the tingling. Couple times an hour. Move around.   shoulder extensions work out.   Issues in the neck before.   subloxation on C5  No shooting pain down the arm.   numbness tingling       MEDICATIONS    No current outpatient medications on file prior to visit.     No current facility-administered medications on file prior to visit.        REVIEW OF SYSTEMS    ROS: 10 pt review of systems performed and all negative except noted in HPI.     PHYSICAL EXAM    /72 (Patient Site: Left Arm, Patient Position: Sitting, Cuff Size: Adult Regular)   Pulse 73   Resp 16   Wt 185 lb (83.9 kg)   SpO2 99%   BMI 25.80 kg/m    GEN:  32 y.o. male sitting comfortably in no apparent distress.   HEENT: EOMI, no scleral icterus  CHEST/LUNG: No respiratory distress  MSK: Positive Spurling's on the left with numbness tingling going down the arm to the fingertips.  Tenderness palpation along the trapezius muscle cervical shoulder and down the thoracic spine.  Left greater than right.  No impingement signs.  No biceps tenderness.  His  strength and upper extremity strength is intact 5 out of 5 bilaterally.  Reflexes of the upper extremities intact plus 2 out of 4 bilaterally.  Osteopathic exam: Has very tight cervical paraspinal muscles appeared to have some facet somatic dysfunction however when I was trying to do some muscle energy  technique on the neck it would elicit his radicular symptoms and I stopped.  Attempted some muscular energy on the left shoulder and during the process found a trigger point in his left trap medial to his scapula mid Thoracics  SKIN: warm, dry, no rashes or lesions  NEURO: Gait normal, coordination intact  PSYCH:  Mood and affect appropriate    Trigger Point Injection     Pre-operative diagnosis: myofascial pain/trigger point identified.    Post-operative diagnosis: myofascial pain, trigger point    After risks and benefits were explained including bleeding, infection, worsening of the pain, damage to the area being injected, allergic reaction to medications, verbal consent was obtained.  All questions were answered.      The area of the trigger point was identified and the skin prepped three times with alcohol and the alcohol allowed to dry.  Next, a 25 gauge 0.5 inch needle was placed in the area of the trigger point.  Once reproduction of the pain was elicited and negative aspiration confirmed, the trigger point was injected and the needle removed.      The patient did tolerate the procedure well and there were no complications.      Medication used: 1ml lidocaine     Trigger points injected: 1      Trigger point(s) location(s):  left trapezius muscle      At the end of the encounter, I discussed results, diagnosis, medications. Discussed red flags for immediate return to clinic/ER, as well as indications for follow up if no improvement. Patient and/or caregiver understood and agreed to plan. Patient was stable for discharge.      Kierra Maznanares

## 2023-01-23 NOTE — ANESTHESIA POSTPROCEDURE EVALUATION
Anesthesia Post Evaluation    Patient: Francesca Durán    Procedure(s) Performed: Procedure(s) (LRB):  LAPAROSCOPY, DIAGNOSTIC,LEFT SALPINGO-0OPHORECTOMY (N/A)    Final Anesthesia Type: general      Patient location during evaluation: PACU  Patient participation: Yes- Able to Participate  Level of consciousness: awake and alert  Post-procedure vital signs: reviewed and stable  Pain management: adequate  Airway patency: patent  DELFINA mitigation strategies: Multimodal analgesia and Extubation while patient is awake  PONV status at discharge: No PONV  Anesthetic complications: no      Cardiovascular status: blood pressure returned to baseline  Respiratory status: unassisted and spontaneous ventilation  Hydration status: euvolemic  Follow-up not needed.          Vitals Value Taken Time   /62 01/22/23 1959   Temp 36.5 °C (97.7 °F) 01/22/23 1959   Pulse 76 01/22/23 1959   Resp 19 01/22/23 1959   SpO2 94 % 01/22/23 1959         Event Time   Out of Recovery 19:46:31         Pain/Emanuel Score: Pain Rating Prior to Med Admin: 2 (1/22/2023 10:47 PM)  Pain Rating Post Med Admin: 2 (1/22/2023 10:55 PM)  Emanuel Score: 10 (1/22/2023  7:38 PM)

## 2023-01-23 NOTE — ANESTHESIA PROCEDURE NOTES
Intubation    Date/Time: 1/22/2023 6:08 PM  Performed by: Mike Brennan CRNA  Authorized by: Yolanda Norwood MD     Intubation:     Induction:  Intravenous    Intubated:  Postinduction    Mask Ventilation:  Easy mask    Attempts:  1    Attempted By:  CRNA    Method of Intubation:  Video laryngoscopy    Blade:  Desir 3    Laryngeal View Grade: Grade I - full view of cords      Difficult Airway Encountered?: No      Complications:  None    Airway Device:  Oral endotracheal tube    Airway Device Size:  7.0    Style/Cuff Inflation:  Cuffed (inflated to minimal occlusive pressure)    Inflation Amount (mL):  5    Tube secured:  21    Secured at:  The lips    Placement Verified By:  Capnometry    Complicating Factors:  None    Findings Post-Intubation:  BS equal bilateral and atraumatic/condition of teeth unchanged

## 2023-01-23 NOTE — H&P
[facility] [place] [facility]  Obstetrics & Gynecology  History & Physical    Patient Name: Francesca Durán  MRN: [id number]  Admission Date: 2023  Primary Care Provider: No primary care provider on file.    Subjective:     Chief Complaint/Reason for Admission: Ovarian Torsion    History of Present Illness:   Francesca Durán is a 42 y.o. female  with past medical history of HTN (no medications) who presents to the ED with LLQ pain that radiates to the left flank. She reports that her pain began this morning about 2 am at which time she took some ibuprofen. She reports that she had no relief with the PO ibuprofen at home. She has associated symptoms of intermittent left flank pain, frequency, nausea, emesis, and diarrhea. Patient denies dysuria, constipation, or other associated symptoms. Patient denies history of kidney stones. No known allergies. While in the ED, she was given morphine IV with no improvement. She continues to complain of abdominal pain, nausea, and flank pain.          OB History    Para Term  AB Living   2 2 2 0 0 1   SAB IAB Ectopic Multiple Live Births   0 0 0 0 1      # Outcome Date GA Lbr Ayo/2nd Weight Sex Delivery Anes PTL Lv   2 Term     M Vag-Spont   BECKY   1 Term     F Vag-Spont   FD     History reviewed. No pertinent past medical history.  History reviewed. No pertinent surgical history.    PTA Medications   Medication Sig    ascorbic acid, vitamin C, (VITAMIN C) 500 mg Chew Take by mouth.    calcium carbonate (TUMS ORAL) Take by mouth.    ibuprofen (ADVIL,MOTRIN) 200 MG tablet Take 400 mg by mouth every 6 (six) hours as needed for Pain.    multivit with calcium,iron,min (WOMEN'S MULTIPLE VITAMINS ORAL) Take by mouth.    simethicone (GAS-X ORAL) Take by mouth.    norethindrone-e.estradiol-iron (LO LOESTRIN FE) 1 mg-10 mcg(24) /10 mcg (2) Tab Take 1 tablet by mouth once daily.    tetrahydrozoline HCl/zinc sulf (EYE DROPS ALLERGY RELIEF OPHT) Apply to eye.        Review of patient's allergies indicates:   Allergen Reactions    Codeine Nausea Only        Family History       Problem Relation (Age of Onset)    Breast cancer Other    Hypertension Father          Tobacco Use    Smoking status: Every Day    Smokeless tobacco: Not on file   Substance and Sexual Activity    Alcohol use: Not Currently    Drug use: Never    Sexual activity: Not on file     Review of Systems   Constitutional:  Negative for chills and fever.   Eyes:  Negative for visual disturbance.   Respiratory:  Negative for cough and wheezing.    Cardiovascular:  Negative for chest pain and palpitations.   Gastrointestinal:  Positive for abdominal pain, diarrhea, nausea and vomiting.   Genitourinary:  Positive for pelvic pain. Negative for dysuria, frequency, hematuria, vaginal bleeding, vaginal discharge and vaginal pain.   Neurological:  Negative for headaches.   Psychiatric/Behavioral:  Negative for depression.     Objective:     Vital Signs (Most Recent):  Temp: 98.4 °F (36.9 °C) (01/22/23 1343)  Pulse: 105 (01/22/23 1515)  Resp: 18 (01/22/23 1609)  BP: (!) 141/71 (01/22/23 1515)  SpO2: 96 % (01/22/23 1515)   Vital Signs (24h Range):  Temp:  [97.7 °F (36.5 °C)-98.4 °F (36.9 °C)] 98.4 °F (36.9 °C)  Pulse:  [] 105  Resp:  [16-18] 18  SpO2:  [96 %-98 %] 96 %  BP: (120-152)/(69-76) 141/71     Weight: 76.2 kg (168 lb)  Body mass index is 30.73 kg/m².    Patient's last menstrual period was 01/18/2023 (exact date).    Physical Exam:   Constitutional: She is oriented to person, place, and time. She appears well-developed and well-nourished. No distress.       Cardiovascular:  Normal rate.             Pulmonary/Chest: Effort normal.        Abdominal: Soft. She exhibits no distension.                 Neurological: She is alert and oriented to person, place, and time.    Skin: Skin is warm and dry.    Psychiatric: She has a normal mood and affect.     Laboratory:  CBC:   Recent Labs   Lab 01/22/23  1218    WBC 18.27*   RBC 4.88   HGB 14.8   HCT 44.0      MCV 90   MCH 30.3   MCHC 33.6     CMP:   Recent Labs   Lab 01/22/23  1218      CALCIUM 10.3   ALBUMIN 4.2   PROT 7.4      K 4.0   CO2 23      BUN 10   CREATININE 0.7   ALKPHOS 81   ALT 7*   AST 14   BILITOT 0.2     Urine Pregnancy Test: No results for input(s): PREGTESTUR in the last 48 hours.    Diagnostic Results:  US: Reviewed 01/22/2023     FINDINGS:  The uterus is not enlarged, measuring 8.2 x 3.8 x 3.2 cm in size.  No uterine masses are seen.  The endometrial stripe is not thickened, measuring approximately 11 mm.  Nabothian cysts are present in the cervix.     The right ovary measures 2.8 x 2.5 x 2.4 cm and has normal arterial and venous flow.  The left ovary appears  enlarged heterogeneous, measuring 4.8 x 4.1 x 3.9 cm.  No discrete mass is seen but the normal ovarian tissue may be completely replaced by mass, noting cystic and solid lesion was evident on earlier CT.  The echogenic and shadowing appearance on today's ultrasound can be seen with ovarian dermoid, although the appearance is not definitively diagnostic.  No arterial flow is detected within this tissue.     There is trace free fluid in the pelvis.     Impression:     The left ovary appears enlarged and heterogeneous, and normal ovarian tissue is likely largely replaced by the ovarian mass seen on earlier CT.  No arterial flow is detectable on color Doppler imaging.  This could be due to solid ovarian mass with relatively minimal perfusion to tumor components.  Alternatively, these findings could reflect ovarian torsion.  Clinical correlation is recommended.         CT: Reviewed  FINDINGS:  Images of the lower thorax are remarkable for bilateral dependent atelectasis.  There is a 3 mm pulmonary nodule adjacent to the fissure within the right lower lobe.     The liver is mildly hypoattenuating, possibly reflecting steatosis.  The spleen, pancreas, gallbladder and adrenal  glands grossly unremarkable.  There is no biliary dilation or ascites.  The stomach is decompressed without significant wall thickening.  The portal vein, splenic vein, SMV, celiac axis and SMA all are patent.  No significant abdominal lymphadenopathy.     The kidneys enhance symmetrically without hydronephrosis.  There is right nonobstructive nephrolithiasis.  The bilateral ureters are unable to be followed in their entirety the urinary bladder, no definite calculi seen or secondary findings to suggest obstructive uropathy.  The urinary bladder is nondistended.  The uterus is unremarkable.  There is a dominant follicle within the right ovary.  There is a cystic and solid appearing focus arising from the left ovary measuring 4.4 cm, internal fat content suggests dermoid however further evaluation could be performed with ultrasound as warranted.  There is a small amount of fluid in the pelvis.     There are a few scattered colonic diverticula without inflammation.  The terminal ileum and appendix are unremarkable.  The small bowel is grossly unremarkable.  There are a few scattered shotty periaortic and paracaval lymph nodes.  There is atherosclerotic calcification of the aorta and its branches.     There are degenerative changes of the pubic symphysis and spine.  No significant inguinal lymphadenopathy.     Impression:     This report was flagged in Epic as abnormal.     1. 3 mm pulmonary nodule within the right lower lobe.  For a solid nodule <6 mm, Fleischner Society 2017 guidelines recommend no routine follow up for a low risk patient, or follow-up with non-contrast chest CT at 12 months in a high risk patient.  2. Findings suggesting dermoid cyst within the left ovary, if there is pain in the region, ultrasound could be performed as warranted.  3. Right nonobstructive nephrolithiasis, no findings to suggest obstructive uropathy.  4. Possible hepatic steatosis, correlation with LFTs recommended.  5. Please see  above for several additional findings    Assessment/Plan:     * Ovarian torsion  - Explained ovarian torsion in detail with patient and   - Recommend removal of the ovary as there is minimal to no ovarian tissue present per US/CT scan  - Consents signed and in chart  - To OR for dx laparoscopy        Jade Jo MD  Obstetrics & Gynecology  [facility] [place] - Surgery

## 2023-01-23 NOTE — HPI
Francesca Durán is a 42 y.o. female  with past medical history of HTN (no medications) who presents to the ED with LLQ pain that radiates to the left flank. She reports that her pain began this morning about 2 am at which time she took some ibuprofen. She reports that she had no relief with the PO ibuprofen at home. She has associated symptoms of intermittent left flank pain, frequency, nausea, emesis, and diarrhea. Patient denies dysuria, constipation, or other associated symptoms. Patient denies history of kidney stones. No known allergies. While in the ED, she was given morphine IV with no improvement. She continues to complain of abdominal pain, nausea, and flank pain.

## 2023-01-23 NOTE — NURSING TRANSFER
Nursing Transfer Note      1/22/2023     Reason patient is being transferred: obs from pacu    Transfer To: 238  From: PACU    Transfer via stretcher    Transfer with n./a    Transported by house    Medicines sent: no    Any special needs or follow-up needed: no    Chart send with patient: Yes    Notified: spouse    Patient reassessed at: 01/22/2023  1938 (date, time)    Upon arrival to floor: patient oriented to room, call bell in reach, and bed in lowest position

## 2023-01-24 ENCOUNTER — TELEPHONE (OUTPATIENT)
Dept: OBSTETRICS AND GYNECOLOGY | Facility: CLINIC | Age: 43
End: 2023-01-24
Payer: MEDICAID

## 2023-01-24 NOTE — OP NOTE
DATE OF OPERATION: 01/24/2023    PREOPERATIVE DIAGNOSIS:   1. Left Ovarian Torsion  2. Left Ovarian Cyst    POSTOPERATIVE DIAGNOSIS:   1. Same    OPERATION PERFORMED:   1. Diagnostic Laparoscopy  2. Left Salpingo-oophorectomy     SURGEON:  Jade Jo MD    ASSISTANT: Chiara Sanabria MD    ANESTHESIA:   General.     FINDINGS:   1. 8 week size uterus on bimanual exam  2. Left meso-salpinix and meso-ovarian twisted around themselves  3. Left ovary enlarged with large solid component    COMPLICATIONS:   None.     Estimated Blood Loss: 5 cc    UOP: 100 cc    IVF: 1000 cc    Procedure in Detail:  After successful induction of general anesthesia, the patient was placed in the dorsal lithotomy position. Abdomen, perineum, and vagina were prepped and draped in a routine fashion. A time-out was performed.  The urinary bladder was catheterized with a Lao catheter and emptied. A sponge stick was placed in the vagina.     Attention was then turned to the abdomen for laparoscopy.     The skin around the umbilicus was grasped withed perforating towel clamps.  The Veress needle was passed through the umbilicus and into the abdomen  An intraabdominal pressure of 4 mmHG was noted.   Approximately 3 Liters of Carbon dioxide was introduced to obtain pneumoperitoneum.  A small cut was made in the  the umbilicus and a 10 mm trocar was inserted at the umbilicus using the Optiview.   Through the trocar, the laparoscope was passed and entry into the abdomen was confirmed. Another incision was made in the left lower quadrant and a 5 mm trocar was inserted under direct visualization.  The same was done in the right lower quadrant.       A left ovarian cysts were noted, each approximately 4 cm in diameter and solid. Both Fallopian tubes were identified; they were normal. The right ovary and uterus appeared normal and without evidence of endometriosis. Cul-de-sac was normal.     Left  meso-salpinix and meso-ovarian twisted around  themselves. Using the Ligasure, the left meso-salpinx and left IP was ligated and cut serially until detached. The camera was removed and an Endocatch bag was placed in the 10mm trocar under direct visualization using the 5 mm camera in the left lateral trocar. The cyst was ruptured in the Endocatch bag and removed in pieces until the bag and contents could be removed in its entirety.      The abdomen and pelvis were thoroughly inspected. Good hemostasis was noted at the resection sites.  The pneumoperitoneum and the instruments were removed under direct visualization.     The skin was closed with 4-0 Monocryl suture and Dermabond.         Sponge, lap and needle counts were correct x 2.  The patient was taken to the recovery room in stable condition.    Jade Stevenson MD

## 2023-01-24 NOTE — TELEPHONE ENCOUNTER
PPV scheduled    ----- Message from Jossy Weston sent at 1/24/2023 10:37 AM CST -----  Regarding: Request for Sooner Apt  Type:  Sooner Appointment Request    Patient is requesting a sooner appointment.  Patient declined first available appointment listed as well as another facility and provider .  Patient will not accept being placed on the waitlist and is requesting a message be sent to doctor.    Name of Caller: Self     When is the first available appointment? No available apts     Symptoms: 6 week post op    Would the patient rather a call back or a response via [facility]? Call     Best Call Back Number: .[phone]

## 2023-01-24 NOTE — DISCHARGE SUMMARY
[facility]  Obstetrics & Gynecology  Discharge Summary    Patient Name: Francesca Durán  MRN: [id number]  Admission Date: 1/22/2023  Hospital Length of Stay: 0 days  Discharge Date and Time: 1/22/2023 10:55 PM  Attending Physician: No att. providers found   Discharging Provider: Jade Jo MD  Primary Care Provider: Primary Doctor No      Hospital Course:   Ms. Durán is a 42 year old female who presents for scheduled Dx Lap secondary to ovarian torsion. Procedure was without complications and the patient tolerated the procedure well. Please see op note for details. The patient was tolerating PO, voiding, ambulating without difficulty, and pain was well controlled after the procedure. Patient was discharged home on POD #0 with instructions to follow up in clinic in 6 weeks for postoperative check. Patient verbalized understanding.        Procedure(s) (LRB):  LAPAROSCOPY, DIAGNOSTIC,LEFT SALPINGO-0OPHORECTOMY (N/A)  OOPHORECTOMY, LAPAROSCOPIC (Left)     Consults:     Significant Diagnostic Studies:   Specimen (24h ago, onward)      None            Pending Diagnostic Studies:       Procedure Component Value Units Date/Time    Specimen to Pathology, Surgery Gynecology and Obstetrics [[id number]] Collected: 01/22/23 1840    Order Status: Sent Lab Status: In process Updated: 01/23/23 0954    Specimen: Tissue           Final Active Diagnoses:    Diagnosis Date Noted POA    PRINCIPAL PROBLEM:  Status post laparoscopy [Z98.890] 01/22/2023 Not Applicable    Ovarian torsion [N83.519] 01/22/2023 Yes      Problems Resolved During this Admission:        Discharged Condition: good    Disposition: Home or Self Care    Follow Up:   Follow-up Information       Jade Jo MD Follow up in 6 week(s).    Specialty: Obstetrics and Gynecology  Why: post-op check  Contact information:  [address]  [address] [place] [id number] [phone]                           Patient Instructions:      Diet  general     Pelvic Rest     Lifting restrictions     Call MD for:  temperature >100.4     Call MD for:  persistent nausea and vomiting     Call MD for:  severe uncontrolled pain     Call MD for:  difficulty breathing, headache or visual disturbances     Call MD for:  hives     Call MD for:  persistent dizziness or light-headedness     Call MD for:  extreme fatigue     Call MD for:     Call MD for:  redness, tenderness, or signs of infection (pain, swelling, redness, odor or green/yellow discharge around incision site)     No dressing needed     Activity as tolerated     Weight bearing restrictions (specify)     Medications:  Reconciled Home Medications:      Medication List        START taking these medications      oxyCODONE-acetaminophen 5-325 mg per tablet  Commonly known as: PERCOCET  Take 1 tablet by mouth every 4 (four) hours as needed for Pain.            CHANGE how you take these medications      ibuprofen 800 MG tablet  Commonly known as: ADVIL,MOTRIN  Take 1 tablet (800 mg total) by mouth every 8 (eight) hours as needed for Pain.  What changed:   medication strength  how much to take  when to take this            CONTINUE taking these medications      EYE DROPS ALLERGY RELIEF OPHT  Apply to eye.     norethindrone-e.estradioL-iron 1 mg-10 mcg (24)/10 mcg (2) Tab  Commonly known as: LO LOESTRIN FE  Take 1 tablet by mouth once daily.     TUMS ORAL  Take by mouth.     VITAMIN C 500 mg Chew  Generic drug: ascorbic acid (vitamin C)  Take by mouth.     WOMEN'S MULTIPLE VITAMINS ORAL  Take by mouth.            STOP taking these medications      GAS-X ORAL              Jade Jo MD  Obstetrics & Gynecology  [facility] - [place], [place] [facility]

## 2023-01-27 LAB
FINAL PATHOLOGIC DIAGNOSIS: NORMAL
Lab: NORMAL

## 2023-03-06 ENCOUNTER — OFFICE VISIT (OUTPATIENT)
Dept: OBSTETRICS AND GYNECOLOGY | Facility: CLINIC | Age: 43
End: 2023-03-06
Payer: MEDICAID

## 2023-03-06 VITALS
SYSTOLIC BLOOD PRESSURE: 120 MMHG | WEIGHT: 165.44 LBS | BODY MASS INDEX: 30.26 KG/M2 | DIASTOLIC BLOOD PRESSURE: 80 MMHG

## 2023-03-06 DIAGNOSIS — Z90.721 STATUS POST UNILATERAL SALPINGO-OOPHORECTOMY: Primary | ICD-10-CM

## 2023-03-06 PROCEDURE — 1160F PR REVIEW ALL MEDS BY PRESCRIBER/CLIN PHARMACIST DOCUMENTED: ICD-10-PCS | Mod: CPTII,,, | Performed by: OBSTETRICS & GYNECOLOGY

## 2023-03-06 PROCEDURE — 99999 PR PBB SHADOW E&M-EST. PATIENT-LVL III: ICD-10-PCS | Mod: PBBFAC,,, | Performed by: OBSTETRICS & GYNECOLOGY

## 2023-03-06 PROCEDURE — 99213 OFFICE O/P EST LOW 20 MIN: CPT | Mod: PBBFAC | Performed by: OBSTETRICS & GYNECOLOGY

## 2023-03-06 PROCEDURE — 99024 PR POST-OP FOLLOW-UP VISIT: ICD-10-PCS | Mod: ,,, | Performed by: OBSTETRICS & GYNECOLOGY

## 2023-03-06 PROCEDURE — 3074F PR MOST RECENT SYSTOLIC BLOOD PRESSURE < 130 MM HG: ICD-10-PCS | Mod: CPTII,,, | Performed by: OBSTETRICS & GYNECOLOGY

## 2023-03-06 PROCEDURE — 3074F SYST BP LT 130 MM HG: CPT | Mod: CPTII,,, | Performed by: OBSTETRICS & GYNECOLOGY

## 2023-03-06 PROCEDURE — 3008F BODY MASS INDEX DOCD: CPT | Mod: CPTII,,, | Performed by: OBSTETRICS & GYNECOLOGY

## 2023-03-06 PROCEDURE — 1160F RVW MEDS BY RX/DR IN RCRD: CPT | Mod: CPTII,,, | Performed by: OBSTETRICS & GYNECOLOGY

## 2023-03-06 PROCEDURE — 1159F PR MEDICATION LIST DOCUMENTED IN MEDICAL RECORD: ICD-10-PCS | Mod: CPTII,,, | Performed by: OBSTETRICS & GYNECOLOGY

## 2023-03-06 PROCEDURE — 3079F DIAST BP 80-89 MM HG: CPT | Mod: CPTII,,, | Performed by: OBSTETRICS & GYNECOLOGY

## 2023-03-06 PROCEDURE — 99999 PR PBB SHADOW E&M-EST. PATIENT-LVL III: CPT | Mod: PBBFAC,,, | Performed by: OBSTETRICS & GYNECOLOGY

## 2023-03-06 PROCEDURE — 3079F PR MOST RECENT DIASTOLIC BLOOD PRESSURE 80-89 MM HG: ICD-10-PCS | Mod: CPTII,,, | Performed by: OBSTETRICS & GYNECOLOGY

## 2023-03-06 PROCEDURE — 3008F PR BODY MASS INDEX (BMI) DOCUMENTED: ICD-10-PCS | Mod: CPTII,,, | Performed by: OBSTETRICS & GYNECOLOGY

## 2023-03-06 PROCEDURE — 1159F MED LIST DOCD IN RCRD: CPT | Mod: CPTII,,, | Performed by: OBSTETRICS & GYNECOLOGY

## 2023-03-06 PROCEDURE — 99024 POSTOP FOLLOW-UP VISIT: CPT | Mod: ,,, | Performed by: OBSTETRICS & GYNECOLOGY

## 2023-03-06 NOTE — PROGRESS NOTES
History & Physical  Gynecology      SUBJECTIVE:     Chief Complaint: Post-op Evaluation       History of Present Illness:  Postoperative Follow-up  Ms. Durán is a 43 y/o female who presents to the clinic 6 weeks status post  Left Salpingo-oophorectomy  for  ovarian torsion and ovarian cyst . Eating a regular diet without difficulty. Bowel movements are normal. The patient is not having any pain.      Review of patient's allergies indicates:   Allergen Reactions    Codeine Nausea Only       History reviewed. No pertinent past medical history.  Past Surgical History:   Procedure Laterality Date    DIAGNOSTIC LAPAROSCOPY N/A 2023    Procedure: LAPAROSCOPY, DIAGNOSTIC,LEFT SALPINGO-0OPHORECTOMY;  Surgeon: Jade Jo MD;  Location: [facility] OR;  Service: OB/GYN;  Laterality: N/A;    LAPAROSCOPIC OOPHORECTOMY Left 2023    Procedure: OOPHORECTOMY, LAPAROSCOPIC;  Surgeon: Jade Jo MD;  Location: [facility] OR;  Service: OB/GYN;  Laterality: Left;     OB History          2    Para   2    Term   2            AB        Living   1         SAB        IAB        Ectopic        Multiple        Live Births   1               Family History   Problem Relation Age of Onset    Hypertension Father     Breast cancer Other      Social History     Tobacco Use    Smoking status: Every Day   Substance Use Topics    Alcohol use: Not Currently    Drug use: Never       Current Outpatient Medications   Medication Sig    ascorbic acid, vitamin C, 500 mg Chew Take by mouth.    calcium carbonate (TUMS ORAL) Take by mouth.    ibuprofen (ADVIL,MOTRIN) 800 MG tablet Take 1 tablet (800 mg total) by mouth every 8 (eight) hours as needed for Pain.    multivit with calcium,iron,min (WOMEN'S MULTIPLE VITAMINS ORAL) Take by mouth.    oxyCODONE-acetaminophen (PERCOCET) 5-325 mg per tablet Take 1 tablet by mouth every 4 (four) hours as needed for Pain.    norethindrone-e.estradiol-iron (LO LOESTRIN FE) 1 mg-10  mcg(24) /10 mcg (2) Tab Take 1 tablet by mouth once daily.    tetrahydrozoline HCl/zinc sulf (EYE DROPS ALLERGY RELIEF OPHT) Apply to eye.     No current facility-administered medications for this visit.         Review of Systems:  Review of Systems   Constitutional:  Negative for chills and fever.   Eyes:  Negative for visual disturbance.   Respiratory:  Negative for cough and wheezing.    Cardiovascular:  Negative for chest pain and palpitations.   Gastrointestinal:  Negative for abdominal pain, nausea and vomiting.   Genitourinary:  Negative for dysuria, frequency, hematuria, pelvic pain, vaginal bleeding, vaginal discharge and vaginal pain.   Neurological:  Negative for headaches.   Psychiatric/Behavioral:  Negative for depression.       OBJECTIVE:     Physical Exam:  Physical Exam  Vitals and nursing note reviewed.   Constitutional:       Appearance: Normal appearance. She is well-developed.   Cardiovascular:      Rate and Rhythm: Normal rate.   Pulmonary:      Effort: Pulmonary effort is normal. No respiratory distress.   Abdominal:      General: There is no distension.      Palpations: Abdomen is soft.      Tenderness: There is no abdominal tenderness.      Comments: Incision healed well   Genitourinary:     Exam position: Supine.   Skin:     General: Skin is warm and dry.   Neurological:      Mental Status: She is oriented to person, place, and time.         ASSESSMENT:       ICD-10-CM ICD-9-CM    1. Status post unilateral salpingo-oophorectomy  Z90.721 V45.77           Plan:      Francesca was seen today for post-op evaluation.    Diagnoses and all orders for this visit:    Status post unilateral salpingo-oophorectomy  - Tolerating a regular diet with no nausea or vomiting  - Reviewed pathology with patient  - Voiding and ambulating with no issues  - Pain controlled  - Doing well        No orders of the defined types were placed in this encounter.      Follow up in about 1 year (around 3/6/2024), or if  symptoms worsen or fail to improve, for Well Woman/Annual.    Counseling time: 15 minutes    Jade Jo

## (undated) DEVICE — GLOVE SURGICAL LATEX SZ 6.5

## (undated) DEVICE — SEE MEDLINE ITEM 154981

## (undated) DEVICE — PACK LAPAROSCOPY/PELVISCOPY II

## (undated) DEVICE — SEE MEDLINE ITEM 157110

## (undated) DEVICE — POSITIONER HEAD DONUT 9IN FOAM

## (undated) DEVICE — SOL NS 1000CC

## (undated) DEVICE — NDL INSUF ULTRA VERESS 120MM

## (undated) DEVICE — SYR 10CC LUER LOCK

## (undated) DEVICE — TROCAR ENDOPATH XCEL 5X100MM

## (undated) DEVICE — DISSECTOR CURVED 5DCD

## (undated) DEVICE — BAG TISS RETRV MONARCH 10MM

## (undated) DEVICE — SUPPORT ULNA NERVE PROTECTOR

## (undated) DEVICE — Device

## (undated) DEVICE — SUT RAPIDE 4-0

## (undated) DEVICE — BLADE SURG CARBON STEEL SZ11

## (undated) DEVICE — UNDERGLOVE BIOGEL PI SZ 6.5 LF

## (undated) DEVICE — BLANKET UPPER BODY 78.7X29.9IN

## (undated) DEVICE — KIT ANTIFOG

## (undated) DEVICE — SEALER LIGASURE LAP 37CM 5MM

## (undated) DEVICE — PAD PREP 50/CA

## (undated) DEVICE — SOL 9P NACL IRR PIC IL

## (undated) DEVICE — TRAY FOLEY 16FR INFECTION CONT

## (undated) DEVICE — ADHESIVE DERMABOND MINI HV

## (undated) DEVICE — TUBING INSUFFLATION 10

## (undated) DEVICE — ELECTRODE REM PLYHSV RETURN 9

## (undated) DEVICE — SEE L#152161